# Patient Record
Sex: FEMALE | Race: WHITE | NOT HISPANIC OR LATINO
[De-identification: names, ages, dates, MRNs, and addresses within clinical notes are randomized per-mention and may not be internally consistent; named-entity substitution may affect disease eponyms.]

---

## 2022-01-16 PROBLEM — Z00.00 ENCOUNTER FOR PREVENTIVE HEALTH EXAMINATION: Status: ACTIVE | Noted: 2022-01-16

## 2022-01-28 ENCOUNTER — TRANSCRIPTION ENCOUNTER (OUTPATIENT)
Age: 52
End: 2022-01-28

## 2022-04-21 ENCOUNTER — RESULT REVIEW (OUTPATIENT)
Age: 52
End: 2022-04-21

## 2023-01-31 ENCOUNTER — TRANSCRIPTION ENCOUNTER (OUTPATIENT)
Age: 53
End: 2023-01-31

## 2023-08-31 ENCOUNTER — NON-APPOINTMENT (OUTPATIENT)
Age: 53
End: 2023-08-31

## 2023-08-31 ENCOUNTER — APPOINTMENT (OUTPATIENT)
Dept: HEART AND VASCULAR | Facility: CLINIC | Age: 53
End: 2023-08-31
Payer: COMMERCIAL

## 2023-08-31 DIAGNOSIS — Z87.891 PERSONAL HISTORY OF NICOTINE DEPENDENCE: ICD-10-CM

## 2023-08-31 DIAGNOSIS — E78.49 OTHER HYPERLIPIDEMIA: ICD-10-CM

## 2023-08-31 DIAGNOSIS — Z78.9 OTHER SPECIFIED HEALTH STATUS: ICD-10-CM

## 2023-08-31 DIAGNOSIS — Z82.49 FAMILY HISTORY OF ISCHEMIC HEART DISEASE AND OTHER DISEASES OF THE CIRCULATORY SYSTEM: ICD-10-CM

## 2023-08-31 PROCEDURE — 99204 OFFICE O/P NEW MOD 45 MIN: CPT

## 2023-08-31 PROCEDURE — 93000 ELECTROCARDIOGRAM COMPLETE: CPT

## 2023-09-03 VITALS — HEART RATE: 60 BPM | RESPIRATION RATE: 16 BRPM | DIASTOLIC BLOOD PRESSURE: 60 MMHG | SYSTOLIC BLOOD PRESSURE: 100 MMHG

## 2023-09-03 PROBLEM — Z82.49 FAMILY HISTORY OF CORONARY ARTERY DISEASE: Status: ACTIVE | Noted: 2023-09-03

## 2023-09-03 PROBLEM — Z87.891 FORMER SMOKER: Status: ACTIVE | Noted: 2023-09-03

## 2023-09-03 PROBLEM — Z78.9 EXERCISES DAILY: Status: ACTIVE | Noted: 2023-09-03

## 2023-09-03 PROBLEM — E78.49 ESSENTIAL FAMILIAL HYPERLIPIDEMIA: Status: ACTIVE | Noted: 2023-09-03

## 2023-09-03 NOTE — REASON FOR VISIT
[Hyperlipidemia] : hyperlipidemia [FreeTextEntry1] : This 53 year old woman with a family h/o premature CAD comes in for evaluation of familial hyperlipidemia.  She brings in a recent fasting lipid profile , , HDL 62, TG 98. Her mother had an MI at age 43 and her grandparents had premature CAD. She is physically active w/o limitation. She denies chest pain, SOB, palpitations or syncope. She is reluctant to take statins.

## 2023-09-03 NOTE — DISCUSSION/SUMMARY
[FreeTextEntry1] : EKG: NSR, IRBBB  Impression: Familial hyperlipidemia with family h/o premature CAD - will order CCTA (results of coronary calcium score to help decision making in regards to starting statin).

## 2023-10-16 ENCOUNTER — NON-APPOINTMENT (OUTPATIENT)
Age: 53
End: 2023-10-16

## 2024-03-14 ENCOUNTER — APPOINTMENT (OUTPATIENT)
Dept: HEART AND VASCULAR | Facility: CLINIC | Age: 54
End: 2024-03-14